# Patient Record
Sex: FEMALE | Race: WHITE | URBAN - METROPOLITAN AREA
[De-identification: names, ages, dates, MRNs, and addresses within clinical notes are randomized per-mention and may not be internally consistent; named-entity substitution may affect disease eponyms.]

---

## 2018-07-19 ENCOUNTER — OFFICE VISIT (OUTPATIENT)
Dept: URBAN - METROPOLITAN AREA CLINIC 29 | Facility: CLINIC | Age: 68
End: 2018-07-19
Payer: MEDICARE

## 2018-07-19 DIAGNOSIS — H25.13 AGE-RELATED NUCLEAR CATARACT, BILATERAL: ICD-10-CM

## 2018-07-19 DIAGNOSIS — H40.033 ANATOMICAL NARROW ANGLE, BILATERAL: Primary | ICD-10-CM

## 2018-07-19 PROCEDURE — 92133 CPTRZD OPH DX IMG PST SGM ON: CPT | Performed by: OPHTHALMOLOGY

## 2018-07-19 PROCEDURE — 99214 OFFICE O/P EST MOD 30 MIN: CPT | Performed by: OPHTHALMOLOGY

## 2018-07-19 ASSESSMENT — INTRAOCULAR PRESSURE
OD: 20
OS: 17

## 2018-07-19 NOTE — IMPRESSION/PLAN
Impression: Anatomical narrow angle, bilateral: H40.033. CCT average OU. -
IOP doing well ou -
LPI ou patient - ONH stable ou - Plan: Discussed diagnosis, explained and understood by patient. Discussed IOP/ONH/Glaucoma management and risks. OCT ordered, performed and reviewed with patient today. No glaucoma treatment recommended OU. Continue to monitor condition and symptoms. Reconsult PRN with Dr. Yenifer Pan.

## 2018-07-19 NOTE — IMPRESSION/PLAN
Impression: Age-related nuclear cataract, bilateral: H25.13. OU. Vision: vision affected. Plan: Cataract account for the patient's complaints. No treatment recommended at this time. The patient will monitor vision changes and contact us with any decrease in vision.

## 2022-03-10 ENCOUNTER — OFFICE VISIT (OUTPATIENT)
Dept: URBAN - METROPOLITAN AREA CLINIC 23 | Facility: CLINIC | Age: 72
End: 2022-03-10
Payer: MEDICARE

## 2022-03-10 DIAGNOSIS — H25.813 COMBINED FORMS OF AGE-RELATED CATARACT, BILATERAL: Primary | ICD-10-CM

## 2022-03-10 PROCEDURE — 99203 OFFICE O/P NEW LOW 30 MIN: CPT | Performed by: OPTOMETRIST

## 2022-03-10 ASSESSMENT — INTRAOCULAR PRESSURE
OD: 21
OS: 22

## 2022-03-10 ASSESSMENT — VISUAL ACUITY
OS: 20/40
OD: 20/30

## 2022-03-10 ASSESSMENT — KERATOMETRY
OS: 42.75
OD: 42.75

## 2022-03-10 NOTE — IMPRESSION/PLAN
Impression: Anatomical narrow angle, bilateral: H40.033 Bilateral. Condition: established, stable. Plan: Discussed findings. LPIs open OU, patient safe to dilated today. Continue to monitor. No ocular pathology noted from episodes of low blood pressure.

## 2022-03-10 NOTE — IMPRESSION/PLAN
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 Condition: established, worsening. Plan: Discussed findings. Cataract mild, no treatment necessary at this time. Continue to monitor, call with any vision changes.

## 2022-07-06 ENCOUNTER — OFFICE VISIT (OUTPATIENT)
Dept: URBAN - METROPOLITAN AREA CLINIC 23 | Facility: CLINIC | Age: 72
End: 2022-07-06
Payer: MEDICARE

## 2022-07-06 DIAGNOSIS — B02.9 ZOSTER WITHOUT COMPLICATIONS: Primary | ICD-10-CM

## 2022-07-06 PROCEDURE — 99212 OFFICE O/P EST SF 10 MIN: CPT | Performed by: OPTOMETRIST

## 2022-07-06 NOTE — IMPRESSION/PLAN
Impression: Zoster without complications: Y23.0. Right. Condition: will continue to monitor. Plan: Discussed findings. No corneal involvement seen. Mild redness, likely secondary to incomplete lid closure. Recommend continuing gel/ointment at bedtime. Continue care with PCP for oral shingles. Call with any worsening symptoms.

## 2023-10-30 ENCOUNTER — OFFICE VISIT (OUTPATIENT)
Dept: URBAN - METROPOLITAN AREA CLINIC 23 | Facility: CLINIC | Age: 73
End: 2023-10-30
Payer: MEDICARE

## 2023-10-30 DIAGNOSIS — H25.813 COMBINED FORMS OF AGE-RELATED CATARACT, BILATERAL: Primary | ICD-10-CM

## 2023-10-30 PROCEDURE — 99213 OFFICE O/P EST LOW 20 MIN: CPT | Performed by: OPTOMETRIST

## 2023-10-30 ASSESSMENT — KERATOMETRY
OS: 43.75
OD: 42.88

## 2023-10-30 ASSESSMENT — INTRAOCULAR PRESSURE
OS: 20
OD: 20